# Patient Record
Sex: MALE | Race: WHITE | Employment: FULL TIME | URBAN - NONMETROPOLITAN AREA
[De-identification: names, ages, dates, MRNs, and addresses within clinical notes are randomized per-mention and may not be internally consistent; named-entity substitution may affect disease eponyms.]

---

## 2020-02-25 ENCOUNTER — OFFICE VISIT (OUTPATIENT)
Dept: ORTHOPEDIC SURGERY | Age: 60
End: 2020-02-25
Payer: COMMERCIAL

## 2020-02-25 ENCOUNTER — HOSPITAL ENCOUNTER (OUTPATIENT)
Dept: GENERAL RADIOLOGY | Age: 60
Discharge: HOME | End: 2020-02-27
Payer: COMMERCIAL

## 2020-02-25 VITALS
BODY MASS INDEX: 22.4 KG/M2 | SYSTOLIC BLOOD PRESSURE: 136 MMHG | HEIGHT: 71 IN | WEIGHT: 160 LBS | DIASTOLIC BLOOD PRESSURE: 80 MMHG | HEART RATE: 74 BPM

## 2020-02-25 PROCEDURE — 73630 X-RAY EXAM OF FOOT: CPT

## 2020-02-25 PROCEDURE — 99203 OFFICE O/P NEW LOW 30 MIN: CPT | Performed by: PODIATRIST

## 2020-02-25 RX ORDER — PREDNISONE 10 MG/1
TABLET ORAL
Qty: 21 TABLET | Refills: 0 | Status: SHIPPED | OUTPATIENT
Start: 2020-02-25 | End: 2020-03-06

## 2020-02-25 NOTE — PROGRESS NOTES
earache  Skin: (-) rash, subcutaneous lesion, open ulceration      Objective:      /80 (Site: Right Upper Arm, Position: Sitting, Cuff Size: Medium Adult)   Pulse 74   Ht 5' 11\" (1.803 m)   Wt 160 lb (72.6 kg)   BMI 22.32 kg/m²     Wt Readings from Last 3 Encounters:   02/25/20 160 lb (72.6 kg)       PHYSICAL EXAMINATION  CONSTITUTIONAL:  awake, alert, cooperative, no apparent distress, and appears stated age  Vascular: Dorsalis pedis and posterior tibial pulses are palpable bilaterally. Skin temperature is warm to warm from proximal tibial tuberosity to distal digits. CFT immediate to exposed digits. Edema wnl. Hair growth ample. Dermatologic: skin envelope is well maintained, hydrated  Neurovascular: epicritic and protopathic sensations grossly intact  Musculoskeletal: Muscle strength 5/5 for all plantarflexors, dorsiflexors, inverters and everters examined. Moderate pain with palpation/manipulation lesser forefoot, particularly with ROM of the 2nd metatarsal phalangeal joint. Dorsal based joint swelling noted. No further acute bony or soft tissue manifestations of note. Imaging  EXAMINATION:   THREE XRAY VIEWS OF THE LEFT FOOT       2/25/2020 1:49 pm       COMPARISON:   None.       HISTORY:   ORDERING SYSTEM PROVIDED HISTORY: Left foot pain   TECHNOLOGIST PROVIDED HISTORY:   Reason for Exam: pain on plantar aspect of left foot between 2nd and 3rd MTP   joints when walking x 1 yr   Acuity: Chronic   Type of Exam: Initial       FINDINGS:   1st MTP joint degenerative changes with hallux valgus.  Minimal subluxation   of the 5th MTP joint.  No fracture.  No focal swelling.           Impression   Degenerative changes of 1st and 5th MTP joint.        LABS       CBC: No results found for: WBC, HGB, HCT, MCV, PLT  BMP: No results found for: NA, K, CL, CO2, PHOS, BUN, CREATININE  PT/INR: No results found for: PROTIME, INR  Prealbumin: No results found for: PREALBUMIN  Albumin:No results found for: LABALBU  Sed Rate:No results found for: SEDRATE  CRP: No results found for: CRP  Micro: No results found for: BC   Hemoglobin A1C: No results found for: LABA1C    Assessment:      Diagnosis Orders   1. Subluxation of metatarsal     2.  Capsulitis of left foot          Procedure Note  Not applicable    Plan:     Patient examined and evaluated  xrays reviewed, discussed in detail  Updated prescription for prednisone, take as directed  Follow up as needed       Keisha Blair [facility]   Electronically signed by Harriet Prince DPM on 2/25/2020 at 2:36 PM